# Patient Record
Sex: FEMALE | Race: WHITE | Employment: UNEMPLOYED | URBAN - METROPOLITAN AREA
[De-identification: names, ages, dates, MRNs, and addresses within clinical notes are randomized per-mention and may not be internally consistent; named-entity substitution may affect disease eponyms.]

---

## 2017-01-03 DIAGNOSIS — G47.09 OTHER INSOMNIA: ICD-10-CM

## 2017-01-03 RX ORDER — TRAZODONE HYDROCHLORIDE 50 MG/1
TABLET ORAL
Qty: 30 TABLET | Refills: 3 | Status: SHIPPED | OUTPATIENT
Start: 2017-01-03 | End: 2017-05-15 | Stop reason: SDUPTHER

## 2017-01-12 ENCOUNTER — OFFICE VISIT (OUTPATIENT)
Dept: INTERNAL MEDICINE CLINIC | Age: 57
End: 2017-01-12

## 2017-01-12 ENCOUNTER — TELEPHONE (OUTPATIENT)
Dept: INTERNAL MEDICINE CLINIC | Age: 57
End: 2017-01-12

## 2017-01-12 VITALS
WEIGHT: 229.5 LBS | DIASTOLIC BLOOD PRESSURE: 80 MMHG | HEIGHT: 68 IN | HEART RATE: 76 BPM | BODY MASS INDEX: 34.78 KG/M2 | TEMPERATURE: 98.9 F | SYSTOLIC BLOOD PRESSURE: 124 MMHG

## 2017-01-12 DIAGNOSIS — L02.91 ABSCESS: Primary | ICD-10-CM

## 2017-01-12 DIAGNOSIS — R22.32 MASS OF ARM, LEFT: ICD-10-CM

## 2017-01-12 PROCEDURE — 10060 I&D ABSCESS SIMPLE/SINGLE: CPT | Performed by: INTERNAL MEDICINE

## 2017-01-12 PROCEDURE — 99214 OFFICE O/P EST MOD 30 MIN: CPT | Performed by: INTERNAL MEDICINE

## 2017-01-12 ASSESSMENT — ENCOUNTER SYMPTOMS
EYES NEGATIVE: 1
CHOKING: 0
APNEA: 0
BACK PAIN: 0
COLOR CHANGE: 1
SINUS PRESSURE: 0
STRIDOR: 0

## 2017-01-13 ENCOUNTER — PROCEDURE VISIT (OUTPATIENT)
Dept: INTERNAL MEDICINE CLINIC | Age: 57
End: 2017-01-13

## 2017-01-13 VITALS
DIASTOLIC BLOOD PRESSURE: 68 MMHG | BODY MASS INDEX: 34.78 KG/M2 | HEART RATE: 68 BPM | WEIGHT: 229.5 LBS | SYSTOLIC BLOOD PRESSURE: 122 MMHG | HEIGHT: 68 IN | TEMPERATURE: 98.3 F

## 2017-01-13 DIAGNOSIS — Z48.00 ABSCESS PACKING REMOVAL: Primary | ICD-10-CM

## 2017-01-13 DIAGNOSIS — Z23 NEED FOR PNEUMOCOCCAL VACCINATION: ICD-10-CM

## 2017-01-13 PROCEDURE — 99024 POSTOP FOLLOW-UP VISIT: CPT | Performed by: INTERNAL MEDICINE

## 2017-01-13 PROCEDURE — 90732 PPSV23 VACC 2 YRS+ SUBQ/IM: CPT | Performed by: INTERNAL MEDICINE

## 2017-01-13 PROCEDURE — 90471 IMMUNIZATION ADMIN: CPT | Performed by: INTERNAL MEDICINE

## 2017-01-13 ASSESSMENT — ENCOUNTER SYMPTOMS
COLOR CHANGE: 0
STRIDOR: 0
CHOKING: 0
SINUS PRESSURE: 0
BACK PAIN: 0
EYES NEGATIVE: 1
APNEA: 0

## 2017-01-17 ENCOUNTER — TELEPHONE (OUTPATIENT)
Dept: INTERNAL MEDICINE CLINIC | Age: 57
End: 2017-01-17

## 2017-01-17 LAB
ANAEROBIC CULTURE: ABNORMAL
GRAM STAIN RESULT: ABNORMAL
WOUND/ABSCESS: ABNORMAL

## 2017-01-19 ENCOUNTER — INITIAL CONSULT (OUTPATIENT)
Dept: SURGERY | Age: 57
End: 2017-01-19

## 2017-01-19 VITALS
DIASTOLIC BLOOD PRESSURE: 70 MMHG | BODY MASS INDEX: 34.71 KG/M2 | WEIGHT: 229 LBS | HEIGHT: 68 IN | SYSTOLIC BLOOD PRESSURE: 114 MMHG

## 2017-01-19 DIAGNOSIS — D17.22 LIPOMA OF LEFT UPPER EXTREMITY: Primary | ICD-10-CM

## 2017-01-19 DIAGNOSIS — L72.3 SEBACEOUS CYST: ICD-10-CM

## 2017-01-19 PROCEDURE — 99243 OFF/OP CNSLTJ NEW/EST LOW 30: CPT | Performed by: SURGERY

## 2017-01-19 ASSESSMENT — ENCOUNTER SYMPTOMS: BACK PAIN: 1

## 2017-01-31 ENCOUNTER — PAT TELEPHONE (OUTPATIENT)
Dept: PREADMISSION TESTING | Age: 57
End: 2017-01-31

## 2017-01-31 ENCOUNTER — SURG/PROC ORDERS (OUTPATIENT)
Dept: ANESTHESIOLOGY | Age: 57
End: 2017-01-31

## 2017-01-31 VITALS — BODY MASS INDEX: 34.71 KG/M2 | WEIGHT: 229 LBS | HEIGHT: 68 IN

## 2017-01-31 RX ORDER — SODIUM CHLORIDE 9 MG/ML
INJECTION, SOLUTION INTRAVENOUS CONTINUOUS
Status: CANCELLED | OUTPATIENT
Start: 2017-01-31

## 2017-01-31 RX ORDER — SODIUM CHLORIDE 0.9 % (FLUSH) 0.9 %
10 SYRINGE (ML) INJECTION EVERY 12 HOURS SCHEDULED
Status: CANCELLED | OUTPATIENT
Start: 2017-01-31

## 2017-01-31 RX ORDER — SODIUM CHLORIDE 0.9 % (FLUSH) 0.9 %
10 SYRINGE (ML) INJECTION PRN
Status: CANCELLED | OUTPATIENT
Start: 2017-01-31

## 2017-01-31 RX ORDER — GABAPENTIN 800 MG/1
800 TABLET ORAL 3 TIMES DAILY
COMMUNITY

## 2017-02-01 ENCOUNTER — HOSPITAL ENCOUNTER (OUTPATIENT)
Dept: SURGERY | Age: 57
Discharge: HOME | End: 2017-02-01
Attending: SURGERY | Admitting: SURGERY

## 2017-02-01 VITALS
OXYGEN SATURATION: 95 % | WEIGHT: 229 LBS | TEMPERATURE: 97.1 F | HEART RATE: 58 BPM | RESPIRATION RATE: 18 BRPM | SYSTOLIC BLOOD PRESSURE: 134 MMHG | DIASTOLIC BLOOD PRESSURE: 67 MMHG | BODY MASS INDEX: 35.34 KG/M2

## 2017-02-01 LAB
ANION GAP SERPL CALCULATED.3IONS-SCNC: 14 MMOL/L (ref 3–16)
BUN BLDV-MCNC: 12 MG/DL (ref 7–20)
CALCIUM SERPL-MCNC: 9.3 MG/DL (ref 8.3–10.6)
CHLORIDE BLD-SCNC: 100 MMOL/L (ref 99–110)
CO2: 28 MMOL/L (ref 21–32)
CREAT SERPL-MCNC: 0.6 MG/DL (ref 0.6–1.1)
GFR AFRICAN AMERICAN: >60
GFR NON-AFRICAN AMERICAN: >60
GLUCOSE BLD-MCNC: 93 MG/DL (ref 70–99)
HCT VFR BLD CALC: 47.1 % (ref 36–48)
HEMOGLOBIN: 16.1 G/DL (ref 12–16)
MCH RBC QN AUTO: 30.4 PG (ref 26–34)
MCHC RBC AUTO-ENTMCNC: 34.2 G/DL (ref 31–36)
MCV RBC AUTO: 88.9 FL (ref 80–100)
PDW BLD-RTO: 13.3 % (ref 12.4–15.4)
PLATELET # BLD: 197 K/UL (ref 135–450)
PMV BLD AUTO: 10.9 FL (ref 5–10.5)
POTASSIUM SERPL-SCNC: 4.4 MMOL/L (ref 3.5–5.1)
RBC # BLD: 5.3 M/UL (ref 4–5.2)
SODIUM BLD-SCNC: 142 MMOL/L (ref 136–145)
WBC # BLD: 13 K/UL (ref 4–11)

## 2017-02-01 PROCEDURE — 93010 ELECTROCARDIOGRAM REPORT: CPT | Performed by: INTERNAL MEDICINE

## 2017-02-01 PROCEDURE — 11420 EXC H-F-NK-SP B9+MARG 0.5/<: CPT | Performed by: SURGERY

## 2017-02-01 PROCEDURE — 24071 EXC ARM/ELBOW LES SC 3 CM/>: CPT | Performed by: SURGERY

## 2017-02-01 RX ORDER — PROMETHAZINE HYDROCHLORIDE 25 MG/ML
6.25 INJECTION, SOLUTION INTRAMUSCULAR; INTRAVENOUS
Status: ACTIVE | OUTPATIENT
Start: 2017-02-01 | End: 2017-02-01

## 2017-02-01 RX ORDER — LABETALOL HYDROCHLORIDE 5 MG/ML
5 INJECTION, SOLUTION INTRAVENOUS EVERY 10 MIN PRN
Status: DISCONTINUED | OUTPATIENT
Start: 2017-02-01 | End: 2017-02-02 | Stop reason: HOSPADM

## 2017-02-01 RX ORDER — SODIUM CHLORIDE 0.9 % (FLUSH) 0.9 %
10 SYRINGE (ML) INJECTION PRN
Status: DISCONTINUED | OUTPATIENT
Start: 2017-02-01 | End: 2017-02-02 | Stop reason: HOSPADM

## 2017-02-01 RX ORDER — SODIUM CHLORIDE 0.9 % (FLUSH) 0.9 %
10 SYRINGE (ML) INJECTION EVERY 12 HOURS SCHEDULED
Status: DISCONTINUED | OUTPATIENT
Start: 2017-02-01 | End: 2017-02-02 | Stop reason: HOSPADM

## 2017-02-01 RX ORDER — FENTANYL CITRATE 50 UG/ML
25 INJECTION, SOLUTION INTRAMUSCULAR; INTRAVENOUS EVERY 5 MIN PRN
Status: DISCONTINUED | OUTPATIENT
Start: 2017-02-01 | End: 2017-02-02 | Stop reason: HOSPADM

## 2017-02-01 RX ORDER — HYDROCODONE BITARTRATE AND ACETAMINOPHEN 5; 325 MG/1; MG/1
1 TABLET ORAL EVERY 6 HOURS PRN
Qty: 30 TABLET | Refills: 0 | Status: SHIPPED | OUTPATIENT
Start: 2017-02-01 | End: 2017-02-08 | Stop reason: SDUPTHER

## 2017-02-01 RX ORDER — SODIUM CHLORIDE 9 MG/ML
INJECTION, SOLUTION INTRAVENOUS CONTINUOUS
Status: DISCONTINUED | OUTPATIENT
Start: 2017-02-01 | End: 2017-02-02 | Stop reason: HOSPADM

## 2017-02-01 RX ADMIN — FENTANYL CITRATE 25 MCG: 50 INJECTION, SOLUTION INTRAMUSCULAR; INTRAVENOUS at 11:56

## 2017-02-01 RX ADMIN — SODIUM CHLORIDE: 9 INJECTION, SOLUTION INTRAVENOUS at 09:16

## 2017-02-01 ASSESSMENT — PAIN DESCRIPTION - PAIN TYPE: TYPE: SURGICAL PAIN

## 2017-02-01 ASSESSMENT — PAIN SCALES - GENERAL
PAINLEVEL_OUTOF10: 4
PAINLEVEL_OUTOF10: 0
PAINLEVEL_OUTOF10: 7

## 2017-02-02 DIAGNOSIS — J45.40 MODERATE PERSISTENT ASTHMA WITHOUT COMPLICATION: ICD-10-CM

## 2017-02-02 LAB
EKG ATRIAL RATE: 59 BPM
EKG DIAGNOSIS: NORMAL
EKG P AXIS: 25 DEGREES
EKG P-R INTERVAL: 158 MS
EKG Q-T INTERVAL: 424 MS
EKG QRS DURATION: 82 MS
EKG QTC CALCULATION (BAZETT): 419 MS
EKG R AXIS: 53 DEGREES
EKG T AXIS: 67 DEGREES
EKG VENTRICULAR RATE: 59 BPM

## 2017-02-03 ENCOUNTER — TELEPHONE (OUTPATIENT)
Dept: SURGERY | Age: 57
End: 2017-02-03

## 2017-02-08 ENCOUNTER — OFFICE VISIT (OUTPATIENT)
Dept: PULMONOLOGY | Age: 57
End: 2017-02-08

## 2017-02-08 VITALS
OXYGEN SATURATION: 96 % | HEART RATE: 70 BPM | HEIGHT: 68 IN | DIASTOLIC BLOOD PRESSURE: 80 MMHG | BODY MASS INDEX: 34.56 KG/M2 | SYSTOLIC BLOOD PRESSURE: 134 MMHG | RESPIRATION RATE: 16 BRPM | WEIGHT: 228 LBS | TEMPERATURE: 99 F

## 2017-02-08 DIAGNOSIS — F17.210 CIGARETTE NICOTINE DEPENDENCE WITHOUT COMPLICATION: ICD-10-CM

## 2017-02-08 DIAGNOSIS — G47.33 OSA TREATED WITH BIPAP: Primary | ICD-10-CM

## 2017-02-08 DIAGNOSIS — J45.40 MODERATE PERSISTENT ASTHMA WITHOUT COMPLICATION: ICD-10-CM

## 2017-02-08 DIAGNOSIS — G47.10 HYPERSOMNIA: ICD-10-CM

## 2017-02-08 PROCEDURE — 99214 OFFICE O/P EST MOD 30 MIN: CPT | Performed by: INTERNAL MEDICINE

## 2017-02-08 ASSESSMENT — SLEEP AND FATIGUE QUESTIONNAIRES
HOW LIKELY ARE YOU TO NOD OFF OR FALL ASLEEP WHILE SITTING AND TALKING TO SOMEONE: 1
HOW LIKELY ARE YOU TO NOD OFF OR FALL ASLEEP WHILE SITTING INACTIVE IN A PUBLIC PLACE: 2
HOW LIKELY ARE YOU TO NOD OFF OR FALL ASLEEP WHILE WATCHING TV: 2
HOW LIKELY ARE YOU TO NOD OFF OR FALL ASLEEP WHILE LYING DOWN TO REST IN THE AFTERNOON WHEN CIRCUMSTANCES PERMIT: 1
HOW LIKELY ARE YOU TO NOD OFF OR FALL ASLEEP WHILE SITTING QUIETLY AFTER LUNCH WITHOUT ALCOHOL: 2
NECK CIRCUMFERENCE (INCHES): 15.75
ESS TOTAL SCORE: 14
HOW LIKELY ARE YOU TO NOD OFF OR FALL ASLEEP IN A CAR, WHILE STOPPED FOR A FEW MINUTES IN TRAFFIC: 2
HOW LIKELY ARE YOU TO NOD OFF OR FALL ASLEEP WHEN YOU ARE A PASSENGER IN A CAR FOR AN HOUR WITHOUT A BREAK: 2
HOW LIKELY ARE YOU TO NOD OFF OR FALL ASLEEP WHILE SITTING AND READING: 2

## 2017-02-10 ENCOUNTER — HOSPITAL ENCOUNTER (OUTPATIENT)
Dept: PULMONOLOGY | Age: 57
Discharge: HOME | End: 2017-02-10
Attending: INTERNAL MEDICINE | Admitting: INTERNAL MEDICINE

## 2017-02-10 DIAGNOSIS — R07.9 CHEST PAIN: ICD-10-CM

## 2017-02-10 DIAGNOSIS — J45.909 MILD ASTHMA: ICD-10-CM

## 2017-02-10 LAB
DLCO: NORMAL ML/MMHG SEC
FEV1/FVC: NORMAL %
FEV1: NORMAL LITERS
FVC: NORMAL LITERS
TLC: NORMAL LITERS

## 2017-02-10 PROCEDURE — 94060 EVALUATION OF WHEEZING: CPT | Performed by: INTERNAL MEDICINE

## 2017-02-10 PROCEDURE — 94729 DIFFUSING CAPACITY: CPT | Performed by: INTERNAL MEDICINE

## 2017-02-10 PROCEDURE — 94727 GAS DIL/WSHOT DETER LNG VOL: CPT | Performed by: INTERNAL MEDICINE

## 2017-02-10 RX ORDER — ALBUTEROL SULFATE 90 UG/1
4 AEROSOL, METERED RESPIRATORY (INHALATION) ONCE
Status: COMPLETED | OUTPATIENT
Start: 2017-02-10 | End: 2017-02-10

## 2017-02-10 RX ADMIN — ALBUTEROL SULFATE 4 PUFF: 90 AEROSOL, METERED RESPIRATORY (INHALATION) at 07:39

## 2017-02-16 ENCOUNTER — OFFICE VISIT (OUTPATIENT)
Dept: SURGERY | Age: 57
End: 2017-02-16

## 2017-02-16 DIAGNOSIS — D17.22 LIPOMA OF LEFT UPPER EXTREMITY: Primary | ICD-10-CM

## 2017-02-16 DIAGNOSIS — L72.3 SEBACEOUS CYST: ICD-10-CM

## 2017-02-16 PROCEDURE — 99024 POSTOP FOLLOW-UP VISIT: CPT | Performed by: SURGERY

## 2017-02-23 ENCOUNTER — OFFICE VISIT (OUTPATIENT)
Dept: SURGERY | Age: 57
End: 2017-02-23

## 2017-02-23 DIAGNOSIS — D17.22 LIPOMA OF LEFT UPPER EXTREMITY: Primary | ICD-10-CM

## 2017-02-23 PROCEDURE — 99024 POSTOP FOLLOW-UP VISIT: CPT | Performed by: SURGERY

## 2017-03-14 RX ORDER — MONTELUKAST SODIUM 10 MG/1
TABLET ORAL
Qty: 30 TABLET | Refills: 3 | Status: SHIPPED | OUTPATIENT
Start: 2017-03-14 | End: 2020-12-17

## 2017-04-13 ENCOUNTER — TELEPHONE (OUTPATIENT)
Dept: ORTHOPEDIC SURGERY | Age: 57
End: 2017-04-13

## 2017-04-13 ENCOUNTER — TELEPHONE (OUTPATIENT)
Dept: SURGERY | Age: 57
End: 2017-04-13

## 2017-04-17 ENCOUNTER — HOSPITAL ENCOUNTER (OUTPATIENT)
Dept: NON INVASIVE DIAGNOSTICS | Age: 57
Discharge: HOME | End: 2017-04-17
Attending: PAIN MEDICINE | Admitting: PAIN MEDICINE

## 2017-04-17 DIAGNOSIS — M54.9 MID BACK PAIN: ICD-10-CM

## 2017-04-18 RX ORDER — ALBUTEROL SULFATE 90 UG/1
2 AEROSOL, METERED RESPIRATORY (INHALATION) EVERY 6 HOURS PRN
Qty: 1 INHALER | Refills: 3 | Status: SHIPPED | OUTPATIENT
Start: 2017-04-18

## 2017-05-15 DIAGNOSIS — G47.09 OTHER INSOMNIA: ICD-10-CM

## 2017-05-15 RX ORDER — TRAZODONE HYDROCHLORIDE 50 MG/1
TABLET ORAL
Qty: 30 TABLET | Refills: 3 | Status: SHIPPED | OUTPATIENT
Start: 2017-05-15 | End: 2017-09-26 | Stop reason: SDUPTHER

## 2017-05-15 RX ORDER — DULOXETIN HYDROCHLORIDE 60 MG/1
CAPSULE, DELAYED RELEASE ORAL
Qty: 90 CAPSULE | Refills: 0 | Status: SHIPPED | OUTPATIENT
Start: 2017-05-15

## 2017-05-24 ENCOUNTER — TELEPHONE (OUTPATIENT)
Dept: PULMONOLOGY | Age: 57
End: 2017-05-24

## 2017-07-14 ENCOUNTER — TELEPHONE (OUTPATIENT)
Dept: SURGERY | Age: 57
End: 2017-07-14

## 2017-07-21 DIAGNOSIS — I10 ESSENTIAL HYPERTENSION: ICD-10-CM

## 2017-07-21 RX ORDER — LOSARTAN POTASSIUM AND HYDROCHLOROTHIAZIDE 12.5; 5 MG/1; MG/1
TABLET ORAL
Qty: 90 TABLET | Refills: 3 | Status: SHIPPED | OUTPATIENT
Start: 2017-07-21 | End: 2020-12-17 | Stop reason: DRUGHIGH

## 2017-07-27 ENCOUNTER — TELEPHONE (OUTPATIENT)
Dept: INTERNAL MEDICINE CLINIC | Age: 57
End: 2017-07-27

## 2017-07-27 ENCOUNTER — OFFICE VISIT (OUTPATIENT)
Dept: INTERNAL MEDICINE CLINIC | Age: 57
End: 2017-07-27

## 2017-07-27 VITALS
TEMPERATURE: 98.8 F | HEART RATE: 72 BPM | WEIGHT: 226 LBS | BODY MASS INDEX: 34.87 KG/M2 | SYSTOLIC BLOOD PRESSURE: 120 MMHG | DIASTOLIC BLOOD PRESSURE: 76 MMHG

## 2017-07-27 DIAGNOSIS — E03.8 OTHER SPECIFIED HYPOTHYROIDISM: ICD-10-CM

## 2017-07-27 DIAGNOSIS — R60.9 EDEMA, UNSPECIFIED TYPE: ICD-10-CM

## 2017-07-27 DIAGNOSIS — M79.606 PAIN OF LOWER EXTREMITY, UNSPECIFIED LATERALITY: ICD-10-CM

## 2017-07-27 DIAGNOSIS — M54.16 LUMBAR RADICULITIS: ICD-10-CM

## 2017-07-27 DIAGNOSIS — G62.89 OTHER POLYNEUROPATHY: Primary | ICD-10-CM

## 2017-07-27 DIAGNOSIS — I10 ESSENTIAL HYPERTENSION: ICD-10-CM

## 2017-07-27 DIAGNOSIS — M48.061 LUMBAR STENOSIS: ICD-10-CM

## 2017-07-27 PROCEDURE — 99214 OFFICE O/P EST MOD 30 MIN: CPT | Performed by: INTERNAL MEDICINE

## 2017-07-27 RX ORDER — TRIAMTERENE AND HYDROCHLOROTHIAZIDE 37.5; 25 MG/1; MG/1
1 CAPSULE ORAL DAILY
Qty: 90 CAPSULE | Refills: 3 | Status: SHIPPED | OUTPATIENT
Start: 2017-07-27 | End: 2020-12-17

## 2017-07-27 ASSESSMENT — ENCOUNTER SYMPTOMS
EYES NEGATIVE: 1
CHOKING: 0
SINUS PRESSURE: 0
COLOR CHANGE: 0
APNEA: 0
STRIDOR: 0
BACK PAIN: 1

## 2017-09-05 DIAGNOSIS — J45.40 MODERATE PERSISTENT ASTHMA WITHOUT COMPLICATION: ICD-10-CM

## 2017-09-05 RX ORDER — BUDESONIDE AND FORMOTEROL FUMARATE DIHYDRATE 160; 4.5 UG/1; UG/1
2 AEROSOL RESPIRATORY (INHALATION) DAILY
Qty: 1 INHALER | Refills: 6 | Status: SHIPPED | OUTPATIENT
Start: 2017-09-05 | End: 2020-12-17

## 2017-09-26 RX ORDER — TRAZODONE HYDROCHLORIDE 50 MG/1
TABLET ORAL
Qty: 30 TABLET | Refills: 3 | Status: SHIPPED | OUTPATIENT
Start: 2017-09-26 | End: 2020-12-17

## 2020-11-03 PROBLEM — I10 HYPERTENSION: Status: RESOLVED | Noted: 2020-11-03 | Resolved: 2020-11-03

## 2020-12-16 NOTE — PROGRESS NOTES
right     chronic    Ovarian cyst, right     Tendonitis     left wrist and left foot     Past Surgical History:   Procedure Laterality Date    BACK SURGERY  05/2020    BREAST LUMPECTOMY      left    CARPAL TUNNEL RELEASE Left 07/28/2016    CARPAL TUNNEL RELEASE Right 08/18/2016    Right carpal tunnel release      CHOLECYSTECTOMY      COLONOSCOPY  not known date. needs repeat.  CYST REMOVAL  02/01/2017    removal of sebaceous cyst posterior neck and removal of multiple lipomas left upper arm    FOOT SURGERY      left heel x4, Right Heel x5    HYSTERECTOMY      OVARIAN CYST REMOVAL Left     SHOULDER ARTHROSCOPY Right 2005-06    SHOULDER ARTHROSCOPY Left 4/2012    WRIST SURGERY Left      Family History   Problem Relation Age of Onset    Diabetes Mother     COPD Mother     Emphysema Mother      Social History     Tobacco Use    Smoking status: Current Every Day Smoker     Packs/day: 1.00     Years: 35.00     Pack years: 35.00     Types: Cigarettes     Start date: 1/1/1975    Smokeless tobacco: Never Used    Tobacco comment: I quit once for 14 years but currently been smoking for 14 yrs   Substance Use Topics    Alcohol use: No    Drug use: No       Allergies   Allergen Reactions    Sulfa Antibiotics Rash     Rash and fever     Current Outpatient Medications   Medication Sig Dispense Refill    oxyCODONE-acetaminophen (PERCOCET) 7.5-325 MG per tablet Take 1 tablet by mouth every 4 hours as needed for Pain.  ALPRAZolam (XANAX) 0.5 MG tablet Take 0.5 mg by mouth nightly as needed for Sleep.       amLODIPine (NORVASC) 10 MG tablet Take 10 mg by mouth daily      cloNIDine (CATAPRES) 0.1 MG tablet Take 0.1 mg by mouth 2 times daily      furosemide (LASIX) 20 MG tablet Take 20 mg by mouth daily      losartan-hydroCHLOROthiazide (HYZAAR) 100-25 MG per tablet Take 1 tablet by mouth daily      meloxicam (MOBIC) 15 MG tablet Take 15 mg by mouth daily      mirtazapine (REMERON) 7.5 MG tablet Take 7.5 mg by mouth nightly      nitroGLYCERIN (NITROSTAT) 0.4 MG SL tablet Place 0.4 mg under the tongue every 5 minutes as needed for Chest pain up to max of 3 total doses. If no relief after 1 dose, call 911.  DULoxetine (CYMBALTA) 60 MG extended release capsule TAKE 1 CAPSULE BY MOUTH DAILY 90 capsule 0    albuterol sulfate HFA (PROVENTIL HFA) 108 (90 BASE) MCG/ACT inhaler Inhale 2 puffs into the lungs every 6 hours as needed for Wheezing 1 Inhaler 3    gabapentin (NEURONTIN) 800 MG tablet Take 800 mg by mouth 3 times daily      omeprazole (PRILOSEC) 40 MG capsule Take 1 capsule by mouth daily 90 capsule 3    levothyroxine (SYNTHROID) 150 MCG tablet Take 1 tablet by mouth daily 90 tablet 3    methocarbamol (ROBAXIN) 750 MG tablet Take 750 mg by mouth 3 times daily   2     Current Facility-Administered Medications   Medication Dose Route Frequency Provider Last Rate Last Admin    methylPREDNISolone acetate (DEPO-MEDROL) injection 80 mg  80 mg Intra-Lesional Once Catrachito Elise MD           Physical Exam:   /72   Pulse 65   Temp 97.1 °F (36.2 °C)   Wt 238 lb 12.8 oz (108.3 kg)   SpO2 94%   BMI 36.85 kg/m²   No intake or output data in the 24 hours ending 12/17/20 1012  Wt Readings from Last 2 Encounters:   12/17/20 238 lb 12.8 oz (108.3 kg)   07/27/17 226 lb (102.5 kg)     Constitutional: She is oriented to person, place, and time. She appears well-developed and well-nourished. In no acute distress. Head: Normocephalic and atraumatic. Neck: Neck supple. No JVD present. Carotid bruit is not present. No mass and no thyromegaly present. No lymphadenopathy present. Cardiovascular: Normal rate, regular rhythm, normal heart sounds and intact distal pulses. Exam reveals no gallop and no friction rub. No murmur heard. Pulmonary/Chest: Effort normal and breath sounds normal. No respiratory distress. She has no wheezes, rhonchi or rales. Abdominal: Soft, non-tender.  Bowel sounds and aorta are normal. She exhibits no organomegaly, mass or bruit. Extremities: No edema, cyanosis, or clubbing. Pulses are 2+ radial/carotid/dorsalis pedis and posterior tibial bilaterally. Neurological: She is alert and oriented to person, place, and time. She has normal reflexes. No cranial nerve deficit. Coordination normal.   Skin: Skin is warm and dry. There is no rash or diaphoresis. Psychiatric: She has a normal mood and affect. Her speech is normal and behavior is normal.     Pulses: Popliteal    L 2  R 2  DP            L 2  R 2  PT             L 2 R 2       Imaging:     PAZ 8/31/20 ([place])   Mild atherosclerotic changes noted within both lower extremities without significant focal plaque formation. No flow significant stenosis is identified. Lab Review:   Lab Results   Component Value Date    TRIG 168 06/20/2016    HDL 34 06/20/2016    LDLCALC 127 06/20/2016    LABVLDL 34 06/20/2016     Lab Results   Component Value Date     02/01/2017    K 4.4 02/01/2017    BUN 12 02/01/2017    CREATININE 0.6 02/01/2017     No results for input(s): WBC, HGB, HCT, PLT in the last 72 hours. Assessment:  1. Bilateral foot pain   2. Chest pain, unstable angina  3. Essential hypertension   4. Tobacco abuse  5. Preoperative Cardiovascular Risk Assessment    Plan:  I reviewed all of Radha's prior cardiovascular testing for this visit. Her lower extremity studies did not reveal any significant stenosis and I do not feel her foot pain is related to peripheral arterial disease. She has 2+ DP and PT pulses in the feet. She can proceed with her upcoming surgery without any testing prior. Given her chest pain and risk factors with smoking however, I will have her complete a stress Lexiscan to evaluate further. I have encouraged her in smoking cessation but she does not have a desire to quit at this time. I will see her in the office pending test results.        This note was scribed in the presence of Mara Loaiza MD by Timm Olszewski, RN. Physician Attestation:  The scribes documentation has been prepared under my direction and personally reviewed by me in its entirety. I, Dr. Valentino Dukes personally performed the services described in this documentation as scribed by my RN in my presence, and I confirm that the note above accurately reflects all work, treatment, procedures, and medical decision making performed by me.

## 2020-12-17 ENCOUNTER — OFFICE VISIT (OUTPATIENT)
Dept: CARDIOLOGY CLINIC | Age: 60
End: 2020-12-17
Payer: MEDICARE

## 2020-12-17 VITALS
TEMPERATURE: 97.1 F | WEIGHT: 238.8 LBS | SYSTOLIC BLOOD PRESSURE: 133 MMHG | BODY MASS INDEX: 36.85 KG/M2 | HEART RATE: 65 BPM | DIASTOLIC BLOOD PRESSURE: 72 MMHG | OXYGEN SATURATION: 94 %

## 2020-12-17 PROCEDURE — 99205 OFFICE O/P NEW HI 60 MIN: CPT | Performed by: INTERNAL MEDICINE

## 2020-12-17 RX ORDER — FUROSEMIDE 20 MG/1
20 TABLET ORAL DAILY
COMMUNITY

## 2020-12-17 RX ORDER — LOSARTAN POTASSIUM AND HYDROCHLOROTHIAZIDE 25; 100 MG/1; MG/1
1 TABLET ORAL DAILY
COMMUNITY

## 2020-12-17 RX ORDER — MELOXICAM 15 MG/1
15 TABLET ORAL DAILY
COMMUNITY

## 2020-12-17 RX ORDER — CLONIDINE HYDROCHLORIDE 0.1 MG/1
0.1 TABLET ORAL 2 TIMES DAILY
COMMUNITY

## 2020-12-17 RX ORDER — ALPRAZOLAM 0.5 MG/1
0.5 TABLET ORAL NIGHTLY PRN
COMMUNITY

## 2020-12-17 RX ORDER — NITROGLYCERIN 0.4 MG/1
0.4 TABLET SUBLINGUAL EVERY 5 MIN PRN
COMMUNITY

## 2020-12-17 RX ORDER — AMLODIPINE BESYLATE 10 MG/1
10 TABLET ORAL DAILY
COMMUNITY

## 2020-12-17 RX ORDER — OXYCODONE AND ACETAMINOPHEN 7.5; 325 MG/1; MG/1
1 TABLET ORAL EVERY 4 HOURS PRN
COMMUNITY

## 2020-12-17 RX ORDER — MIRTAZAPINE 7.5 MG/1
7.5 TABLET, FILM COATED ORAL NIGHTLY
COMMUNITY

## 2020-12-30 ENCOUNTER — TELEPHONE (OUTPATIENT)
Dept: CARDIOLOGY CLINIC | Age: 60
End: 2020-12-30

## 2020-12-30 NOTE — TELEPHONE ENCOUNTER
Scheduled for left heart catheterization on 1/18/2021 at 8:00 am with arrival time of 6:45 am.       The morning of your procedure you will park in the hospital parking lot and report directly to the cath lab to check in.     Pre-Procedure Instructions   1. You will need to fast for at least 8 hours prior to procedure. No caffeine the morning of.   2. Hold your diuretic, Lasix the morning of procedure. 3. All other medications can be taken in the morning with sips of water. 4. You will need to take 325 mg aspirin the morning of. If you are currently taking 81 mg please take 4 tablets that morning. 5. Do not use any lotions, creams or perfume the morning of procedure. 6. Pre-procedure lab work will need to be completed 5-7 days prior to procedure. 7. Please have a responsible adult to drive you home after procedure. We advise you have someone stay with you for the first 24 hours for precautionary measures. Depending on your procedure you may require an overnight stay. 8. Cath lab will provide you with all post procedure instructions. If you have any questions regarding the procedure itself or medications, please call [phone] and ask to speak with a nurse. Case published to [facility] and form emailed to Trever Knight in the cath lab.

## 2021-01-18 ENCOUNTER — HOSPITAL ENCOUNTER (OUTPATIENT)
Dept: CARDIAC CATH/INVASIVE PROCEDURES | Age: 61
Discharge: HOME | End: 2021-01-18
Payer: MEDICARE

## 2021-01-18 VITALS
RESPIRATION RATE: 11 BRPM | BODY MASS INDEX: 37.67 KG/M2 | SYSTOLIC BLOOD PRESSURE: 142 MMHG | TEMPERATURE: 97.5 F | WEIGHT: 240 LBS | OXYGEN SATURATION: 95 % | HEART RATE: 65 BPM | HEIGHT: 67 IN | DIASTOLIC BLOOD PRESSURE: 81 MMHG

## 2021-01-18 DIAGNOSIS — R94.39 ABNORMAL CARDIOVASCULAR STRESS TEST: ICD-10-CM

## 2021-01-18 DIAGNOSIS — R07.89 OTHER CHEST PAIN: ICD-10-CM

## 2021-01-18 LAB
ANION GAP SERPL CALCULATED.3IONS-SCNC: 9 MMOL/L (ref 3–16)
BUN BLDV-MCNC: 13 MG/DL (ref 7–20)
CALCIUM SERPL-MCNC: 9.7 MG/DL (ref 8.3–10.6)
CHLORIDE BLD-SCNC: 98 MMOL/L (ref 99–110)
CO2: 34 MMOL/L (ref 21–32)
CREAT SERPL-MCNC: 0.8 MG/DL (ref 0.6–1.2)
EKG ATRIAL RATE: 65 BPM
EKG DIAGNOSIS: NORMAL
EKG P AXIS: 42 DEGREES
EKG P-R INTERVAL: 162 MS
EKG Q-T INTERVAL: 450 MS
EKG QRS DURATION: 84 MS
EKG QTC CALCULATION (BAZETT): 468 MS
EKG R AXIS: 74 DEGREES
EKG T AXIS: 86 DEGREES
EKG VENTRICULAR RATE: 65 BPM
GFR AFRICAN AMERICAN: >60
GFR NON-AFRICAN AMERICAN: >60
GLUCOSE BLD-MCNC: 141 MG/DL (ref 70–99)
POTASSIUM SERPL-SCNC: 3.6 MMOL/L (ref 3.5–5.1)
SODIUM BLD-SCNC: 141 MMOL/L (ref 136–145)

## 2021-01-18 PROCEDURE — 93010 ELECTROCARDIOGRAM REPORT: CPT | Performed by: INTERNAL MEDICINE

## 2021-01-18 PROCEDURE — C1894 INTRO/SHEATH, NON-LASER: HCPCS

## 2021-01-18 PROCEDURE — 80048 BASIC METABOLIC PNL TOTAL CA: CPT

## 2021-01-18 PROCEDURE — 93005 ELECTROCARDIOGRAM TRACING: CPT | Performed by: INTERNAL MEDICINE

## 2021-01-18 PROCEDURE — 6360000004 HC RX CONTRAST MEDICATION: Performed by: INTERNAL MEDICINE

## 2021-01-18 PROCEDURE — 2500000003 HC RX 250 WO HCPCS

## 2021-01-18 PROCEDURE — 6360000002 HC RX W HCPCS

## 2021-01-18 PROCEDURE — 2709999900 HC NON-CHARGEABLE SUPPLY

## 2021-01-18 PROCEDURE — 93458 L HRT ARTERY/VENTRICLE ANGIO: CPT | Performed by: INTERNAL MEDICINE

## 2021-01-18 PROCEDURE — 2580000003 HC RX 258

## 2021-01-18 PROCEDURE — 93458 L HRT ARTERY/VENTRICLE ANGIO: CPT

## 2021-01-18 PROCEDURE — 99152 MOD SED SAME PHYS/QHP 5/>YRS: CPT

## 2021-01-18 PROCEDURE — 99153 MOD SED SAME PHYS/QHP EA: CPT

## 2021-01-18 PROCEDURE — C1769 GUIDE WIRE: HCPCS

## 2021-01-18 RX ORDER — SODIUM CHLORIDE 0.9 % (FLUSH) 0.9 %
10 SYRINGE (ML) INJECTION EVERY 12 HOURS SCHEDULED
Status: DISCONTINUED | OUTPATIENT
Start: 2021-01-18 | End: 2021-01-19 | Stop reason: HOSPADM

## 2021-01-18 RX ORDER — SODIUM CHLORIDE 0.9 % (FLUSH) 0.9 %
10 SYRINGE (ML) INJECTION PRN
Status: DISCONTINUED | OUTPATIENT
Start: 2021-01-18 | End: 2021-01-19 | Stop reason: HOSPADM

## 2021-01-18 RX ORDER — SODIUM CHLORIDE 9 MG/ML
INJECTION, SOLUTION INTRAVENOUS CONTINUOUS
Status: DISCONTINUED | OUTPATIENT
Start: 2021-01-18 | End: 2021-01-19 | Stop reason: HOSPADM

## 2021-01-18 RX ORDER — ATORVASTATIN CALCIUM 20 MG/1
20 TABLET, FILM COATED ORAL DAILY
Qty: 30 TABLET | Refills: 5 | Status: SHIPPED | OUTPATIENT
Start: 2021-01-18 | End: 2021-06-03 | Stop reason: SDUPTHER

## 2021-01-18 RX ORDER — ASPIRIN 325 MG
325 TABLET ORAL ONCE
Status: DISCONTINUED | OUTPATIENT
Start: 2021-01-18 | End: 2021-01-19 | Stop reason: HOSPADM

## 2021-01-18 RX ORDER — ACETAMINOPHEN 325 MG/1
650 TABLET ORAL EVERY 4 HOURS PRN
Status: DISCONTINUED | OUTPATIENT
Start: 2021-01-18 | End: 2021-01-19 | Stop reason: HOSPADM

## 2021-01-18 RX ADMIN — IOPAMIDOL 60 ML: 755 INJECTION, SOLUTION INTRAVENOUS at 09:48

## 2021-01-18 NOTE — PROCEDURES
[address] [place] [address]                            CARDIAC CATHETERIZATION    PATIENT NAME: Netta Sosa                :        1960  MED REC NO:   [id number]                          ROOM:  ACCOUNT NO:   [de-identified]                           ADMIT DATE: 2021  PROVIDER:     Dakota Wheatley MD    DATE OF PROCEDURE:  2021    This is a [facility] cardiology catheterization report. INDICATION FOR PROCEDURE:  Chest pain, abnormal cardiovascular stress  test.    PROCEDURE:  The risks and benefits of the procedure were explained to  the patient and informed consent was obtained. An Evertt Dines test was  performed on the right wrist to ensure an intact palmar arch. She was  prepped and draped in sterile fashion. Anesthesia was provided in the  volar surface of the right wrist with 1% lidocaine injected  subcutaneously and deep. The right radial artery was accessed and  cannulated and a 5-Grenadian sheath inserted using Seldinger technique. Pre-cocktail of heparin, verapamil and nitroglycerin was given through  the sheath port. Over the 0.035 J-wire, the JL3.5, 5-Grenadian diagnostic catheter was  advanced to the ostium of the left main coronary artery. Coronary  angiography was performed to this system. The catheter was removed over  the wire. Next, the JR5, 5-Grenadian diagnostic catheter was advanced to  the ostium of the right coronary artery. Coronary angiography was  performed to this system. The catheter was removed over the wire. Lastly, the pigtail catheter was advanced over the wire into the left  ventricle. Left ventricular end-diastolic pressure measurements were  obtained and then a power injection left ventriculogram was performed. Catheter was flushed and placed back on pressure and then pulled back  across the aortic valve to assess for gradient.   Catheter was removed  over the wire. The post cocktail of verapamil and nitroglycerin was given through the  sheath port. The right radial sheath was removed and a Terumo pressure  band applied for nonocclusive hemostasis. There were no complications  from the procedure and estimated blood loss was less than 20 mL. Moderate sedation was performed for the procedure. The patient had an  ASA grade of II and a Mallampati score of II. Total duration of  sedation was 40 minutes. The patient received 3 mg of IV Versed and 100  mcg of fentanyl. Vital signs were monitored throughout the sedation  period and remained stable. There were no complications from sedation. FINDINGS:  1. Right-dominant coronary arterial circulation. Right coronary artery  has no angiographic evidence of coronary atherosclerosis. In the left  system, there is no left main disease or significant circumflex. The  left anterior descending artery has a focal 60% lesion in the large  diagonal branch. The LAD after the takeoff of this also has a 60%  lesion. The LAD becomes smaller in diameter here and the diagonal is  much larger. 2.  Normal left ventricular systolic function with LV ejection fraction  at 65%. 3.  Low left ventricular end-diastolic pressure at 4 mmHg. 4.  No gradient across the aortic valve on pullback to suggest aortic  stenosis.         Rita Wagner MD    D: 01/18/2021 10:09:27       T: 01/18/2021 10:12:35     [email or web address]  Job#: [id number]     Doc#: [id number]    CC:  Wilmer Thornton

## 2021-01-18 NOTE — ANESTHESIA PRE-OP
Brief Pre-Op Note/Sedation Assessment      Servando Phlegm  1960  Room/bed info not found      [id number]  9:19 AM    Planned Procedure: Cardiac Catheterization Procedure    Post Procedure Plan: Return to same level of care    Consent: I have discussed with the patient and/or the patient representative the indication, alternatives, and the possible risks and/or complications of the planned procedure and the anesthesia methods. The patient and/or patient representative appear to understand and agree to proceed. Chief Complaint: Chest Pain/Pressure      Indications for Cath Procedure: Worsening Angina  Anginal Classification within 2 weeks:  CCS II - Slight limitation, with angina only during vigorous physical activity  NYHA Heart Failure Class within 2 weeks: No symptoms  Is Cath Lab Visit Valve-related?: No  Surgical Risk: N/A  Functional Type: N/A    Anti- Anginal Meds within 2 weeks:   Yes: Aspirin    Stress or Imaging Studies Performed (within 6 months):  Stress Test with SPECT Result: Positive:  inferior Risk/Extent of Ischemia:  Intermediate     Vital Signs:  BP (!) 142/81   Pulse 65   Temp 97.5 °F (36.4 °C) (Infrared)   Resp 11   Ht 5' 7\" (1.702 m)   Wt 240 lb (108.9 kg)   SpO2 95%   BMI 37.59 kg/m²     Allergies:   Allergies   Allergen Reactions    Sulfa Antibiotics Rash     Rash and fever       Past Medical History:  Past Medical History:   Diagnosis Date    Arthritis     left knee    Back pain     Carpal tunnel syndrome, bilateral     Headache(784.0)     Hypertension     Hypothyroidism     Injury of shoulder, left 2/2012    Sl[employer] Workers Comp Claim    Knee pain, right     chronic    Ovarian cyst, right     Tendonitis     left wrist and left foot         Surgical History:  Past Surgical History:   Procedure Laterality Date    BACK SURGERY  05/2020    BREAST LUMPECTOMY      left    CARPAL TUNNEL RELEASE Left 07/28/2016    CARPAL TUNNEL RELEASE Right 08/18/2016 Right carpal tunnel release      CHOLECYSTECTOMY      COLONOSCOPY  not known date. needs repeat.  CYST REMOVAL  02/01/2017    removal of sebaceous cyst posterior neck and removal of multiple lipomas left upper arm    FOOT SURGERY      left heel x4, Right Heel x5    HYSTERECTOMY      OVARIAN CYST REMOVAL Left     SHOULDER ARTHROSCOPY Right 2005-06    SHOULDER ARTHROSCOPY Left 4/2012    WRIST SURGERY Left          Medications:  Current Outpatient Medications   Medication Sig Dispense Refill    oxyCODONE-acetaminophen (PERCOCET) 7.5-325 MG per tablet Take 1 tablet by mouth every 4 hours as needed for Pain.  ALPRAZolam (XANAX) 0.5 MG tablet Take 0.5 mg by mouth nightly as needed for Sleep.  amLODIPine (NORVASC) 10 MG tablet Take 10 mg by mouth daily      cloNIDine (CATAPRES) 0.1 MG tablet Take 0.1 mg by mouth 2 times daily      furosemide (LASIX) 20 MG tablet Take 20 mg by mouth daily      losartan-hydroCHLOROthiazide (HYZAAR) 100-25 MG per tablet Take 1 tablet by mouth daily      meloxicam (MOBIC) 15 MG tablet Take 15 mg by mouth daily      mirtazapine (REMERON) 7.5 MG tablet Take 7.5 mg by mouth nightly      DULoxetine (CYMBALTA) 60 MG extended release capsule TAKE 1 CAPSULE BY MOUTH DAILY 90 capsule 0    albuterol sulfate HFA (PROVENTIL HFA) 108 (90 BASE) MCG/ACT inhaler Inhale 2 puffs into the lungs every 6 hours as needed for Wheezing 1 Inhaler 3    gabapentin (NEURONTIN) 800 MG tablet Take 800 mg by mouth 3 times daily      omeprazole (PRILOSEC) 40 MG capsule Take 1 capsule by mouth daily 90 capsule 3    levothyroxine (SYNTHROID) 150 MCG tablet Take 1 tablet by mouth daily 90 tablet 3    methocarbamol (ROBAXIN) 750 MG tablet Take 750 mg by mouth 3 times daily   2    nitroGLYCERIN (NITROSTAT) 0.4 MG SL tablet Place 0.4 mg under the tongue every 5 minutes as needed for Chest pain up to max of 3 total doses. If no relief after 1 dose, call 911.        Current Facility-Administered Medications   Medication Dose Route Frequency Provider Last Rate Last Admin    0.9 % sodium chloride infusion   Intravenous Continuous Bob Zapien MD        sodium chloride flush 0.9 % injection 10 mL  10 mL Intravenous 2 times per day Bob Zapien MD        sodium chloride flush 0.9 % injection 10 mL  10 mL Intravenous PRN Bob Zapien MD        aspirin tablet 325 mg  325 mg Oral Once Bob Zapien MD        methylPREDNISolone acetate (DEPO-MEDROL) injection 80 mg  80 mg Intra-Lesional Once Artem Robins MD               Pre-Sedation:    Pre-Sedation Documentation and Exam:  I have personally completed a history, physical exam & review of systems for this patient (see notes). Prior History of Anesthesia Complications:   none    Modified Mallampati:  II (soft palate, uvula, fauces visible)    ASA Classification:  Class 2 - A normal healthy patient with mild systemic disease      Javed Scale: Activity:  2 - Able to move 4 extremities voluntarily on command  Respiration:  2 - Able to breathe deeply and cough freely  Circulation:  2 - BP+/- 20mmHg of normal  Consciousness:  2 - Fully awake  Oxygen Saturation (color):  2 - Able to maintain oxygen saturation >92% on room air    Sedation/Anesthesia Plan:  Guard the patient's safety and welfare. Minimize physical discomfort and pain. Minimize negative psychological responses to treatment by providing sedation and analgesia and maximize the potential amnesia. Patient to meet pre-procedure discharge plan.     Medication Planned:  midazolam intravenously and fentanyl intravenously    Patient is an appropriate candidate for plan of sedation: yes      Electronically signed by Vincent Glover MD on 1/18/2021 at 9:19 AM
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
## 2021-06-03 ENCOUNTER — OFFICE VISIT (OUTPATIENT)
Dept: CARDIOLOGY CLINIC | Age: 61
End: 2021-06-03
Payer: MEDICARE

## 2021-06-03 VITALS
HEART RATE: 78 BPM | HEIGHT: 68 IN | WEIGHT: 255 LBS | BODY MASS INDEX: 38.65 KG/M2 | OXYGEN SATURATION: 98 % | SYSTOLIC BLOOD PRESSURE: 133 MMHG | DIASTOLIC BLOOD PRESSURE: 90 MMHG

## 2021-06-03 DIAGNOSIS — E78.5 HYPERLIPIDEMIA LDL GOAL <70: ICD-10-CM

## 2021-06-03 DIAGNOSIS — I25.10 CORONARY ARTERY DISEASE INVOLVING NATIVE CORONARY ARTERY OF NATIVE HEART WITHOUT ANGINA PECTORIS: Primary | ICD-10-CM

## 2021-06-03 DIAGNOSIS — I10 ESSENTIAL HYPERTENSION: ICD-10-CM

## 2021-06-03 PROCEDURE — 99213 OFFICE O/P EST LOW 20 MIN: CPT | Performed by: INTERNAL MEDICINE

## 2021-06-03 RX ORDER — ATORVASTATIN CALCIUM 40 MG/1
40 TABLET, FILM COATED ORAL DAILY
Qty: 90 TABLET | Refills: 3 | Status: SHIPPED | OUTPATIENT
Start: 2021-06-03 | End: 2022-05-23

## 2021-06-11 ENCOUNTER — TELEPHONE (OUTPATIENT)
Dept: CARDIOLOGY CLINIC | Age: 61
End: 2021-06-11

## 2021-06-11 NOTE — TELEPHONE ENCOUNTER
Radha called in this morning stating that she went to the [facility] office yesterday to get her blood drawn and no one was there. I notified her that we do not draw blood at the [facility] location. She asked if Dr. Ivon Martinez has had a chance to talk to Dr. José Benz about how her blood flow was during surgery. Radha also stated that she has had labs drawn at [facility] for Dr. Jean Fang office recently and wanted to know if we could use those results. Radha can be reached at [phone].

## 2021-06-11 NOTE — TELEPHONE ENCOUNTER
Called patient to inform her that we will reach out to her once Dr. Julito Lawrence speaks with Dr. Milka Webb. I also provided our fax number and asked her to have labs sent from [facility]. She v/u.

## 2021-06-17 NOTE — TELEPHONE ENCOUNTER
Also received a fax from [address] [place] [address] heel wound and bone culture 5/19/21. Will have scanned under media.

## 2021-07-30 ENCOUNTER — TELEPHONE (OUTPATIENT)
Dept: CARDIOLOGY CLINIC | Age: 61
End: 2021-07-30

## 2021-07-30 DIAGNOSIS — I25.10 CORONARY ARTERY DISEASE INVOLVING NATIVE CORONARY ARTERY OF NATIVE HEART WITHOUT ANGINA PECTORIS: Primary | ICD-10-CM

## 2021-07-30 NOTE — TELEPHONE ENCOUNTER
Dr. Maximiliano Lazaro with patient regarding swelling and shortness of breath. She has been experiencing swelling for the past 2-3 weeks. She will wear compression stockings but not consistently. She has increased her Lasix some days and will take up to 60 mg. She has not seen any benefit in the swelling. Her shortness of breath presents with exertion but she is able to lay flat to sleep at night. Discussed her swelling sounds related to venous insufficiency and not heart failure. Encouraged daily use of compression stockings, elevating her legs often and closely monitoring sodium. She will complete a BMP as she has increased her Lasix dose temporarily. Any further recommendations?

## 2021-07-30 NOTE — TELEPHONE ENCOUNTER
Called patient with message from Joanne Bauer. Radha verbalized and confirmed understanding. Patient said that she just had her lab work drawn about 30 minutes ago. Informed Juliet KUO  OK to close encounter per Joanne Bauer.

## 2021-07-30 NOTE — TELEPHONE ENCOUNTER
Radha called in this morning stating her feet have been swollen, she is having SOB, but she doesn't know if that is because of her lung condition. States that was having chest pain the other day and had to take a nitro. Radha is not having active chest pain. Her weight is 250 lbs. Radha can be reached at  [phone].

## 2021-07-30 NOTE — TELEPHONE ENCOUNTER
Discussed with Dr. Vera Morataya and he agreed with all previous recommendations including compression stockings, elevating her legs and closely monitoring her sodium intake. She needs to complete BMP as ordered either Monday or Tuesday. Please call to discuss. We will call with results once labs are completed.

## 2021-12-01 NOTE — PROGRESS NOTES
[address]    Belen Tavares  1960 December 2, 2021    CC: \"I had chest pain\"    HPI:  The patient is 64 y.o. female with a past medical history significant for nonobstructive CAD, hyperlipidemia hypertension and COPD. She was seen in office and voiced complaints of exertional chest pain. She completed an outpatient stress perfusion revealing reversible ischemia. She underwent a heart catheterization on 1/18/21 showing nonobstructive CAD that did not require any intervention. She presents today for follow up. She reports feeling fatigued since she was last seen in the office. She has been walking her dogs for exercise and denies exertional symptoms. She reports a few episodes of chest pain and did take nitroglycerin for this with resolution of the pain. Her breathing has been overall comfortable. She reports her previous foot wound continues to heal. She denies cramping or pain in her legs when walking. She is a current smoker and is now smoking 1/2 PPD. Review of Systems:  Constitutional: No fatigue, weakness, night sweats or fever. HEENT: No new vision difficulties or ringing in the ears. Respiratory: Chronic SOB. No PND, orthopnea or cough. Cardiovascular: See HPI   GI: No n/v, diarrhea, constipation, abdominal pain or changes in bowel habits. No melena, no hematochezia  : No urinary frequency, urgency, incontinence, hematuria or dysuria. Skin: No cyanosis or skin lesions. Musculoskeletal: Bilateral foot \"burning\". No new muscle or joint pain. Neurological: No syncope or TIA-like symptoms.   Psychiatric: No anxiety, insomnia or depression     Past Medical History:   Diagnosis Date    Arthritis     left knee    Back pain     Carpal tunnel syndrome, bilateral     Headache(784.0)     Hypertension     Hypothyroidism     Injury of shoulder, left 2/2012    Slap Tear Workers Comp Claim    Knee pain, right     chronic    Ovarian cyst, right     Tendonitis     left wrist and left foot     Past Surgical History:   Procedure Laterality Date    BACK SURGERY  05/2020    BREAST LUMPECTOMY      left    CARPAL TUNNEL RELEASE Left 07/28/2016    CARPAL TUNNEL RELEASE Right 08/18/2016    Right carpal tunnel release      CHOLECYSTECTOMY      COLONOSCOPY  not known date. needs repeat.  CYST REMOVAL  02/01/2017    removal of sebaceous cyst posterior neck and removal of multiple lipomas left upper arm    FOOT SURGERY      left heel x4, Right Heel x5    HYSTERECTOMY      OVARIAN CYST REMOVAL Left     SHOULDER ARTHROSCOPY Right 2005-06    SHOULDER ARTHROSCOPY Left 4/2012    WRIST SURGERY Left      Family History   Problem Relation Age of Onset    Diabetes Mother     COPD Mother     Emphysema Mother      Social History     Tobacco Use    Smoking status: Current Every Day Smoker     Packs/day: 1.00     Years: 35.00     Pack years: 35.00     Types: Cigarettes     Start date: 1/1/1975    Smokeless tobacco: Never Used    Tobacco comment: I quit once for 14 years but currently been smoking for 14 yrs   Vaping Use    Vaping Use: Former    Substances: Flavoring   Substance Use Topics    Alcohol use: No    Drug use: No       Allergies   Allergen Reactions    Sulfa Antibiotics Rash     Rash and fever     Current Outpatient Medications   Medication Sig Dispense Refill    atorvastatin (LIPITOR) 40 MG tablet Take 1 tablet by mouth daily 90 tablet 3    oxyCODONE-acetaminophen (PERCOCET) 7.5-325 MG per tablet Take 1 tablet by mouth every 4 hours as needed for Pain.  ALPRAZolam (XANAX) 0.5 MG tablet Take 0.5 mg by mouth nightly as needed for Sleep.       amLODIPine (NORVASC) 10 MG tablet Take 10 mg by mouth daily      cloNIDine (CATAPRES) 0.1 MG tablet Take 0.1 mg by mouth 2 times daily      furosemide (LASIX) 20 MG tablet Take 20 mg by mouth daily      losartan-hydroCHLOROthiazide (HYZAAR) 100-25 MG per tablet Take 1 tablet by mouth daily      meloxicam (MOBIC) 15 MG tablet Take 15 mg by mouth daily      mirtazapine (REMERON) 7.5 MG tablet Take 7.5 mg by mouth nightly      nitroGLYCERIN (NITROSTAT) 0.4 MG SL tablet Place 0.4 mg under the tongue every 5 minutes as needed for Chest pain up to max of 3 total doses. If no relief after 1 dose, call 911.  DULoxetine (CYMBALTA) 60 MG extended release capsule TAKE 1 CAPSULE BY MOUTH DAILY 90 capsule 0    albuterol sulfate HFA (PROVENTIL HFA) 108 (90 BASE) MCG/ACT inhaler Inhale 2 puffs into the lungs every 6 hours as needed for Wheezing 1 Inhaler 3    gabapentin (NEURONTIN) 800 MG tablet Take 800 mg by mouth 3 times daily      omeprazole (PRILOSEC) 40 MG capsule Take 1 capsule by mouth daily 90 capsule 3    levothyroxine (SYNTHROID) 150 MCG tablet Take 1 tablet by mouth daily 90 tablet 3    methocarbamol (ROBAXIN) 750 MG tablet Take 750 mg by mouth 3 times daily   2     Current Facility-Administered Medications   Medication Dose Route Frequency Provider Last Rate Last Admin    methylPREDNISolone acetate (DEPO-MEDROL) injection 80 mg  80 mg Intra-LESional Once Festus Larose MD           Physical Exam:   /78   Pulse 66   Ht 5' 8\" (1.727 m)   Wt 250 lb (113.4 kg)   SpO2 95%   BMI 38.01 kg/m²   No intake or output data in the 24 hours ending 12/02/21 1520  Wt Readings from Last 2 Encounters:   12/02/21 250 lb (113.4 kg)   06/03/21 255 lb (115.7 kg)     Constitutional: She is oriented to person, place, and time. She appears well-developed and well-nourished. In no acute distress. Head: Normocephalic and atraumatic. Neck: Neck supple. No JVD present. Carotid bruit is not present. No mass and no thyromegaly present. No lymphadenopathy present. Cardiovascular: Normal rate, regular rhythm, normal heart sounds and intact distal pulses. Exam reveals no gallop and no friction rub. No murmur heard. Pulmonary/Chest: Effort normal and breath sounds normal. No respiratory distress.  She has no wheezes, rhonchi or rales. Abdominal: Soft, non-tender. Bowel sounds and aorta are normal. She exhibits no organomegaly, mass or bruit. Extremities: No edema, cyanosis, or clubbing. Pulses are 2+ radial/carotid/dorsalis pedis and posterior tibial bilaterally. Neurological: She is alert and oriented to person, place, and time. She has normal reflexes. No cranial nerve deficit. Coordination normal.   Skin: Skin is warm and dry. There is no rash or diaphoresis. Psychiatric: She has a normal mood and affect. Her speech is normal and behavior is normal.     Pulses: Popliteal    L 2  R 2  DP            L 2  R 2  PT             L 2 R 2         Procedures:     C 1/18/21  1. Right-dominant coronary arterial circulation. Right coronary artery  has no angiographic evidence of coronary atherosclerosis. In the left  system, there is no left main disease or significant circumflex. The  left anterior descending artery has a focal 60% lesion in the large  diagonal branch. The LAD after the takeoff of this also has a 60%  lesion. The LAD becomes smaller in diameter here and the diagonal is  much larger. 2.  Normal left ventricular systolic function with LV ejection fraction  at 65%. 3.  Low left ventricular end-diastolic pressure at 4 mmHg. 4.  No gradient across the aortic valve on pullback to suggest aortic  stenosis. Imaging:     [facility] 8/31/20 ([facility])   Mild atherosclerotic changes noted within both lower extremities without significant focal plaque formation. No flow significant stenosis is identified.      [facility]   Reversible perfusion is suspected in the inferior wall in the midportion   towards the apex, best seen on the short axis images.  This is a new finding   when compared to a previous study from 12/18/2017.  No significant wall   motion abnormality is identified involving the inferior wall.       Normal left ventricular wall motion is seen throughout the left ventricle.     Left ventricular ejection fraction is calculated at 56% which is within   normal limits. .     Echo 3/19/21 [facility])   - Left ventricle: The cavity size is normal. Left ventricular     geometry shows evidence of concentric hypertrophy, with increased     ventricular mass and increased relative wall thickness. Systolic     function was normal. The calculated ejection fraction was in the   [place] of 55% to 60%, by biplane method of disks. Wall motion was     normal; there were no regional wall motion abnormalities.     Features are consistent with a pseudonormal left ventricular     filling pattern, with concomitant abnormal relaxation and     increased filling pressure (grade 2 diastolic dysfunction). The     stroke volume is 95ml. The stroke index is 43ml/m^2. - Right ventricle: The cavity size is mildly to moderately dilated.     Wall thickness is normal. Systolic function is normal.   - Aortic valve: The peak systolic gradient is 9mm Hg.   - Mitral valve: The annulus is mildly calcified. There was mild     regurgitation.   - Left atrium: The atrium is mildly to moderately dilated. - Inferior vena cava: The vessel was dilated; the respirophasic     diameter changes were in the normal range (&gt;= 50%); findings are     consistent with mildly elevated central venous pressure. - Pericardium, extracardiac: A trivial pericardial effusion was     identified. Lab Review:   Lab Results   Component Value Date    TRIG 168 06/20/2016    HDL 34 06/20/2016    LDLCALC 127 06/20/2016    LABVLDL 34 06/20/2016     Lab Results   Component Value Date     07/30/2021    K 3.3 07/30/2021    BUN 12 07/30/2021    CREATININE 0.76 07/30/2021         Assessment:  1. CAD of native coronary arteries without angina   2. Hyperlipidemia with LDL goal <70 mg/dL   3. Essential hypertension   4. Tobacco abuse    Plan:  I think that Ms. Veronica Almazan  is entirely stable from a cardiovascular standpoint.  I see no need to make any changes currently in her medical regimen or pursue further testing. She can continue to use nitroglycerin for breakthrough chest pain. Her blood pressure is well controlled today in the office. We will continue her on statin therapy for her hyperlipidemia. I have encouraged her in complete smoking cessation. I will see her in the office for follow up in 1 year. Physician Attestation:  The scribes documentation has been prepared under my direction and personally reviewed by me in its entirety. I, Dr. Stuart Chicas personally performed the services described in this documentation as scribed by my RN in my presence, and I confirm that the note above accurately reflects all work, treatment, procedures, and medical decision making performed by me. This note was scribed in the presence of Calla Lesch, MD by [employer], RN. Physician Attestation:  The scribes documentation has been prepared under my direction and personally reviewed by me in its entirety. I, Dr. Stuart Chicas personally performed the services described in this documentation as scribed by my RN in my presence, and I confirm that the note above accurately reflects all work, treatment, procedures, and medical decision making performed by me.

## 2021-12-02 ENCOUNTER — OFFICE VISIT (OUTPATIENT)
Dept: CARDIOLOGY CLINIC | Age: 61
End: 2021-12-02
Payer: MEDICARE

## 2021-12-02 VITALS
BODY MASS INDEX: 37.89 KG/M2 | WEIGHT: 250 LBS | SYSTOLIC BLOOD PRESSURE: 126 MMHG | DIASTOLIC BLOOD PRESSURE: 78 MMHG | OXYGEN SATURATION: 95 % | HEART RATE: 66 BPM | HEIGHT: 68 IN

## 2021-12-02 DIAGNOSIS — I25.10 CORONARY ARTERY DISEASE INVOLVING NATIVE CORONARY ARTERY OF NATIVE HEART WITHOUT ANGINA PECTORIS: Primary | ICD-10-CM

## 2021-12-02 DIAGNOSIS — I10 ESSENTIAL HYPERTENSION: ICD-10-CM

## 2021-12-02 DIAGNOSIS — E78.5 HYPERLIPIDEMIA LDL GOAL <70: ICD-10-CM

## 2021-12-02 PROCEDURE — 99214 OFFICE O/P EST MOD 30 MIN: CPT | Performed by: INTERNAL MEDICINE

## 2022-05-23 NOTE — TELEPHONE ENCOUNTER
Last OV: 12/2/21  Next OV: 1 yr f/u 12/2022  Last refill:6/3/21  Most recent Labs: 7/30/21  Last EKG (if needed):1/18/21

## 2022-05-24 RX ORDER — ATORVASTATIN CALCIUM 40 MG/1
40 TABLET, FILM COATED ORAL DAILY
Qty: 90 TABLET | Refills: 3 | Status: SHIPPED | OUTPATIENT
Start: 2022-05-24 | End: 2023-05-24

## 2022-07-05 ENCOUNTER — TELEPHONE (OUTPATIENT)
Dept: CARDIOLOGY CLINIC | Age: 62
End: 2022-07-05

## 2022-07-05 NOTE — TELEPHONE ENCOUNTER
Cardiac Risk Assessment message information:     What type of procedure are you having: L foot sx      When is your procedure scheduled for: 7/26/22     Who is the physician performing your procedure: Dr Pete Rhodes in [address] [place] [address]    Which medications need to be held for your procedure and for how long :[address] for 5 days           Phone Number:[phone]      Fax number to send the letter: [phone] # [phone]

## 2022-07-05 NOTE — TELEPHONE ENCOUNTER
Dr. Michelle Romero     Please review and advise in Dr. Trav Wooten absence. Patient last seen in the office 12/2/21 with hx of nonobstructive CAD. Will need to hold ASA for 5 days prior.

## 2022-07-20 NOTE — TELEPHONE ENCOUNTER
[facility] in [address] [place] [address] called to request records for anesthesia team.      They are requesting last office note, and any cardiac testing. Fax request resent from [facility]  Records printed and faxed as requested.      Records sent include: Office note 12/2/21, Nuc stress [facility]) 12/30/20, Cath report 1/18/21

## 2022-11-21 RX ORDER — ATORVASTATIN CALCIUM 40 MG/1
40 TABLET, FILM COATED ORAL DAILY
Qty: 90 TABLET | Refills: 3 | Status: SHIPPED | OUTPATIENT
Start: 2022-11-21 | End: 2023-11-21

## 2023-02-27 NOTE — TELEPHONE ENCOUNTER
Patient needs to be seen in office before we can refill medications. Please call patient to schedule a follow up appointment.

## 2023-02-27 NOTE — TELEPHONE ENCOUNTER
Last OV:  12/2/21  Next OV: X  Last refill: 11/21/22  #90  3 R/F  Most recent Labs:  5/19/21  Last EKG (if needed):  1/18/21

## 2023-03-06 RX ORDER — ATORVASTATIN CALCIUM 40 MG/1
40 TABLET, FILM COATED ORAL DAILY
Qty: 30 TABLET | Refills: 0 | Status: SHIPPED | OUTPATIENT
Start: 2023-03-06 | End: 2024-03-05

## 2023-03-22 ENCOUNTER — TELEPHONE (OUTPATIENT)
Dept: CARDIOLOGY CLINIC | Age: 63
End: 2023-03-22

## 2023-03-22 NOTE — TELEPHONE ENCOUNTER
Called patient to see if she can move her appointment on Thursday to an earlier time - she states she thought she already canceled due to having a cold. I canceled appointment for her. She does not wish to reschedule at this time. Encouraged to call the office when she does. She v/u.

## 2023-10-04 ENCOUNTER — OFFICE VISIT (OUTPATIENT)
Dept: INFECTIOUS DISEASES | Age: 63
End: 2023-10-04
Payer: MEDICARE

## 2023-10-04 ENCOUNTER — HOSPITAL ENCOUNTER (OUTPATIENT)
Age: 63
Discharge: HOME | End: 2023-10-04
Payer: MEDICARE

## 2023-10-04 ENCOUNTER — HOSPITAL ENCOUNTER (OUTPATIENT)
Dept: GENERAL RADIOLOGY | Age: 63
Discharge: HOME | End: 2023-10-04
Attending: INTERNAL MEDICINE
Payer: MEDICARE

## 2023-10-04 VITALS
BODY MASS INDEX: 36.62 KG/M2 | WEIGHT: 241.6 LBS | DIASTOLIC BLOOD PRESSURE: 80 MMHG | HEIGHT: 68 IN | TEMPERATURE: 97.1 F | SYSTOLIC BLOOD PRESSURE: 114 MMHG | HEART RATE: 81 BPM | OXYGEN SATURATION: 93 %

## 2023-10-04 DIAGNOSIS — B39.9 HISTOPLASMOSIS: ICD-10-CM

## 2023-10-04 DIAGNOSIS — Z79.620 ADALIMUMAB (HUMIRA) LONG-TERM USE: ICD-10-CM

## 2023-10-04 DIAGNOSIS — M06.9 RHEUMATOID ARTHRITIS, INVOLVING UNSPECIFIED SITE, UNSPECIFIED WHETHER RHEUMATOID FACTOR PRESENT (HCC): ICD-10-CM

## 2023-10-04 DIAGNOSIS — R91.8 MULTIPLE LUNG NODULES ON CT: ICD-10-CM

## 2023-10-04 DIAGNOSIS — F17.200 SMOKING: ICD-10-CM

## 2023-10-04 DIAGNOSIS — Z79.631 METHOTREXATE, LONG TERM, CURRENT USE: ICD-10-CM

## 2023-10-04 DIAGNOSIS — B39.9 HISTOPLASMOSIS: Primary | ICD-10-CM

## 2023-10-04 LAB
HAV IGM SERPL QL IA: NORMAL
HBV CORE IGM SERPL QL IA: NORMAL
HBV SURFACE AG SERPL QL IA: NORMAL
HCV AB SERPL QL IA: NORMAL

## 2023-10-04 PROCEDURE — 3074F SYST BP LT 130 MM HG: CPT | Performed by: INTERNAL MEDICINE

## 2023-10-04 PROCEDURE — 99204 OFFICE O/P NEW MOD 45 MIN: CPT | Performed by: INTERNAL MEDICINE

## 2023-10-04 PROCEDURE — 71046 X-RAY EXAM CHEST 2 VIEWS: CPT

## 2023-10-04 PROCEDURE — 3078F DIAST BP <80 MM HG: CPT | Performed by: INTERNAL MEDICINE

## 2023-10-04 RX ORDER — LEVOTHYROXINE SODIUM 0.2 MG/1
200 TABLET ORAL DAILY
COMMUNITY

## 2023-10-04 RX ORDER — ADALIMUMAB 10MG/0.1ML
40 KIT SUBCUTANEOUS
COMMUNITY

## 2023-10-04 NOTE — PROGRESS NOTES
Infectious Diseases Clinic Consult Note       Reason for Consult: For evaluation of Granulomatous infection noted on CT chest possible Histoplasmosis on Humira   Requesting Physician:    Dr. Kitty Wesley Physician:  Leroy Guerrero MD  History Obtained From:   Patient , Medical Records     CHIEF COMPLAINT:    Chief Complaint   Patient presents with    New Patient     Hx of Histoplasmosis, Referred by DUNCAN Erickson       HISTORY OF PRESENT ILLNESS: 61 y.o. female here for evaluation of possible histoplasmosis. She has pneumonitis currently on methotrexate and Humira. She started Imuran recently 2 months ago has noticed improvement in her joint symptoms. It appears the patient had a pneumonia back in 2014 was evaluated by Pulmonary service she was seen by Luisana Infante and had CT chest in 2015, which indicated possible healed granulomatous disease on the CT. She has h/o  COPD, smoking, Follow up CT chest in 2016 multiple small calcified nodules stable consistent with old granulomas noted and other wise negative. She never had testing for Histoplasmosis, she was tested Negative for TB per patient. Rheumatology plans Cimzia for her RA and she is here to see if she needs any additional testing for possible previous exposure of Histoplasmosis. She has no active lung symptoms no fevers no sweats or wt changes.       Past Medical History:    Past Medical History:   Diagnosis Date    Arthritis     left knee    Back pain     Carpal tunnel syndrome, bilateral     Headache(784.0)     Hypertension     Hypothyroidism     Injury of shoulder, left 2/2012    Slap Tear Workers Comp Claim    Knee pain, right     chronic    Ovarian cyst, right     Tendonitis     left wrist and left foot       Past Surgical History:    Past Surgical History:   Procedure Laterality Date    BACK SURGERY  05/2020    BREAST LUMPECTOMY      left    CARPAL TUNNEL RELEASE Left 07/28/2016    CARPAL TUNNEL RELEASE Right 08/18/2016    Right carpal

## 2023-10-05 LAB
CRYPTOC AG SER QL: NEGATIVE
HIV 1+2 AB+HIV1 P24 AG SERPL QL IA: NORMAL
HIV 2 AB SERPL QL IA: NORMAL
HIV1 AB SERPL QL IA: NORMAL
HIV1 P24 AG SERPL QL IA: NORMAL

## 2023-10-06 LAB
(1,3)-BETA-D-GLUCAN (FUNGITELL) INTERPRETATION: NEGATIVE
1,3 BETA GLUCAN SER-MCNC: 34 PG/ML
H CAPSUL AG SER IA-ACNC: NOT DETECTED
H CAPSUL AG SER QL IA: NOT DETECTED
H CAPSUL AG UR IA-ACNC: NOT DETECTED NG/ML
H CAPSUL AG UR QL IA: NOT DETECTED

## 2023-10-07 LAB
H CAPSUL MYC AB TITR SER CF: NORMAL {TITER}
H CAPSUL YST AB TITR SER CF: NORMAL {TITER}

## 2023-10-08 LAB
ASPERGILLUS AB SER QL ID: NOT DETECTED
B DERMAT AB SER QL ID: NOT DETECTED
COCCIDIOIDES AB SPEC QL ID: NOT DETECTED
H CAPSUL AB TITR SER ID: NOT DETECTED {TITER}

## 2023-11-01 ENCOUNTER — TELEMEDICINE (OUTPATIENT)
Dept: INFECTIOUS DISEASES | Age: 63
End: 2023-11-01
Payer: MEDICARE

## 2023-11-01 DIAGNOSIS — Z79.631 METHOTREXATE, LONG TERM, CURRENT USE: ICD-10-CM

## 2023-11-01 DIAGNOSIS — M06.9 RHEUMATOID ARTHRITIS, INVOLVING UNSPECIFIED SITE, UNSPECIFIED WHETHER RHEUMATOID FACTOR PRESENT (HCC): ICD-10-CM

## 2023-11-01 DIAGNOSIS — R91.8 MULTIPLE LUNG NODULES ON CT: Primary | ICD-10-CM

## 2023-11-01 DIAGNOSIS — Z79.620 ADALIMUMAB (HUMIRA) LONG-TERM USE: ICD-10-CM

## 2023-11-01 PROCEDURE — 99213 OFFICE O/P EST LOW 20 MIN: CPT | Performed by: INTERNAL MEDICINE

## 2023-11-01 NOTE — PROGRESS NOTES
Manuel Melgar ................ Negative     31-59 pg/mL . ......................... Negative     60-79 pg/mL . ......................... Indeterminate              Component Ref Range & Units 10/4/23 1202   Crypto Ag Negative Negative    Resulting Agency  [facility]              Specimen Collected: 10/04/23 12:02 EDT Last Resulted: 10/05/23 07           MICRO: cultures reviewed and updated by me   Blood Culture: No results found for: \"BC\", \"BLOODCULT2\"    Urine Culture: No results for input(s): \"LABURIN\" in the last 72 hours. All pertinent results, images and reports for the current visit  were reviewed by me. IMPRESSION  Patient Active Problem List   Diagnosis Code    Arthritis M19.90    Tendonitis M77.9    Intermittent claudication (HCC) I73.9    Peripheral neuropathy G62.9    Lumbar radiculitis M54.16    Pain in limb M79.609    Lumbar stenosis M48.061    DDD (degenerative disc disease), lumbar M51.36    Facet syndrome, lumbar M47.816    Hypothyroidism E03.9    Essential hypertension I10    Hyperlipidemia E78.5    Vitamin D deficiency E55.9    Hyperglycemia R73.9    Bilateral carpal tunnel syndrome G56.03    Lipoma of left upper extremity D17.22    Sebaceous cyst L72.3    Mild asthma J45.909    Abnormal cardiovascular stress test R94.39    Other chest pain R07.89       ICD-10-CM    1. Multiple lung nodules on CT  R91.8       2. Rheumatoid arthritis, involving unspecified site, unspecified whether rheumatoid factor present ([address])  M06.9       3. BMI 36.0-36.9,adult  Z68.36       4. Adalimumab (Humira) long-term use  Z79.620       5.  Methotrexate, long term, current use  Z79.631           No resp symptoms and Ct CHEST has healed granulomatous lesions and Fungal testing has come back  Negative and there is no evidence of Histoplasmosis at this time and I do not see any particular risk for reactivation given the serologies are Negative and lesions are calcified  - pt was still educated about the risk and benefits

## 2024-01-03 RX ORDER — ATORVASTATIN CALCIUM 40 MG/1
40 TABLET, FILM COATED ORAL DAILY
Qty: 90 TABLET | Refills: 4 | OUTPATIENT
Start: 2024-01-03 | End: 2025-01-02

## 2024-01-08 DIAGNOSIS — E78.5 HYPERLIPIDEMIA, UNSPECIFIED HYPERLIPIDEMIA TYPE: Primary | ICD-10-CM

## 2024-01-08 NOTE — TELEPHONE ENCOUNTER
Last OV: 12/02/2021  Next OV: x  Most recent Labs: no recent       Pt needs f/u and updated labs. Labs have been placed.     Called pt in regards to message above and she states she will call us back later because she is currently in the hospital.

## 2024-01-10 RX ORDER — ATORVASTATIN CALCIUM 40 MG/1
40 TABLET, FILM COATED ORAL DAILY
Qty: 90 TABLET | Refills: 0 | OUTPATIENT
Start: 2024-01-10 | End: 2025-01-09

## 2024-01-10 NOTE — TELEPHONE ENCOUNTER
Called patient and both number listed is disconnected.          Contact Information   [phone] (Home Phone)    [phone] (Mobile)    Alternate     Sony Laguna (Spouse)   [phone] (Mobile)
